# Patient Record
Sex: MALE | NOT HISPANIC OR LATINO | Employment: FULL TIME | URBAN - METROPOLITAN AREA
[De-identification: names, ages, dates, MRNs, and addresses within clinical notes are randomized per-mention and may not be internally consistent; named-entity substitution may affect disease eponyms.]

---

## 2021-04-01 ENCOUNTER — TRANSCRIBE ORDERS (OUTPATIENT)
Dept: ADMINISTRATIVE | Facility: HOSPITAL | Age: 36
End: 2021-04-01

## 2021-04-01 DIAGNOSIS — R07.9 CHEST PAIN, UNSPECIFIED TYPE: Primary | ICD-10-CM

## 2022-04-05 ENCOUNTER — HOSPITAL ENCOUNTER (OUTPATIENT)
Dept: GENERAL RADIOLOGY | Facility: HOSPITAL | Age: 37
Discharge: HOME | End: 2022-04-05
Admitting: FAMILY MEDICINE

## 2022-04-05 ENCOUNTER — TRANSCRIBE ORDERS (OUTPATIENT)
Dept: ADMINISTRATIVE | Facility: HOSPITAL | Age: 37
End: 2022-04-05

## 2022-04-05 DIAGNOSIS — M54.50 LOW BACK PAIN, UNSPECIFIED BACK PAIN LATERALITY, UNSPECIFIED CHRONICITY, UNSPECIFIED WHETHER SCIATICA PRESENT: ICD-10-CM

## 2022-04-05 DIAGNOSIS — M54.50 LOW BACK PAIN, UNSPECIFIED BACK PAIN LATERALITY, UNSPECIFIED CHRONICITY, UNSPECIFIED WHETHER SCIATICA PRESENT: Primary | ICD-10-CM

## 2022-04-05 PROCEDURE — 72100 X-RAY EXAM L-S SPINE 2/3 VWS: CPT

## 2023-07-31 ENCOUNTER — OFFICE VISIT (OUTPATIENT)
Dept: CARDIOLOGY | Facility: CLINIC | Age: 38
End: 2023-07-31
Payer: COMMERCIAL

## 2023-07-31 VITALS
DIASTOLIC BLOOD PRESSURE: 68 MMHG | OXYGEN SATURATION: 98 % | HEIGHT: 71 IN | WEIGHT: 234.6 LBS | BODY MASS INDEX: 32.84 KG/M2 | HEART RATE: 94 BPM | SYSTOLIC BLOOD PRESSURE: 140 MMHG

## 2023-07-31 DIAGNOSIS — I10 PRIMARY HYPERTENSION: ICD-10-CM

## 2023-07-31 DIAGNOSIS — R07.9 CHEST PAIN, UNSPECIFIED TYPE: Primary | ICD-10-CM

## 2023-07-31 DIAGNOSIS — R00.2 PALPITATIONS: ICD-10-CM

## 2023-07-31 PROCEDURE — 93000 ELECTROCARDIOGRAM COMPLETE: CPT | Performed by: INTERNAL MEDICINE

## 2023-07-31 PROCEDURE — 99204 OFFICE O/P NEW MOD 45 MIN: CPT | Performed by: INTERNAL MEDICINE

## 2023-07-31 RX ORDER — LOSARTAN POTASSIUM 100 MG/1
100 TABLET ORAL DAILY
COMMUNITY
Start: 2023-07-03

## 2023-07-31 RX ORDER — FENOFIBRATE 145 MG/1
145 TABLET, COATED ORAL DAILY
COMMUNITY
Start: 2023-07-03

## 2023-07-31 RX ORDER — MECLIZINE HCL 12.5 MG/1
12.5 TABLET ORAL AS NEEDED
COMMUNITY

## 2023-07-31 RX ORDER — AMLODIPINE BESYLATE 10 MG/1
10 TABLET ORAL DAILY
COMMUNITY
Start: 2023-07-10 | End: 2023-07-31 | Stop reason: ALTCHOICE

## 2023-07-31 NOTE — PROGRESS NOTES
"     [facility] Cardiology OP Consult Note    Bolivar Carreon  [id number]  2023    Referred By: Artur Clinton MD    Chief Complaint: Hypertension and chest pain    History of Present Illness:   Mr. Bolivar Carreon is a 38 y.o. male who presents to the Cardiology Clinic for evaluation of hypertension and chest pain.  The patient has a past medical history significant for hypertension, hyperlipidemia, and obesity with a BMI 32.  He does not have any significant past cardiac history.  He reports occasional episodes of palpitations, described as a \"fluttering\" sensation.  With his palpitations, he will occasionally experience chest discomfort described as a \"sharp\" sensation.  He denies any significant chest pain with exertion.  No associated nausea, vomiting, or diaphoresis.  No significant exertional dyspnea.  He reports his antihypertensives were recently changed to losartan and amlodipine, however his systolic BP has remained elevated.  No other specific complaints today.    Past Cardiac Testin. Last Coronary Angio: None  2. Prior Stress Testing: None  3. Last Echo: None  4. Prior Holter Monitor: None    Review of Systems:   Review of Systems   Constitutional:  Negative for activity change, appetite change, chills, diaphoresis, fatigue, fever, unexpected weight gain and unexpected weight loss.   Eyes:  Negative for blurred vision and double vision.   Respiratory:  Negative for cough, chest tightness, shortness of breath and wheezing.    Cardiovascular:  Positive for chest pain and palpitations. Negative for leg swelling.   Gastrointestinal:  Negative for abdominal pain, anal bleeding, blood in stool and GERD.   Endocrine: Negative for cold intolerance and heat intolerance.   Genitourinary:  Negative for hematuria.   Neurological:  Negative for dizziness, syncope, weakness and light-headedness.   Hematological:  Does not bruise/bleed easily.   Psychiatric/Behavioral:  Negative for depressed " "mood and stress. The patient is not nervous/anxious.      Past Medical History: History reviewed. No pertinent past medical history.    Past Surgical History:   Past Surgical History:   Procedure Laterality Date    LEG LACERATION REPAIR Right        Family History:   Family History   Problem Relation Age of Onset    Hypertension Mother     Hypertension Father        Social History:   Social History     Socioeconomic History    Marital status: Single   Tobacco Use    Smoking status: Never   Vaping Use    Vaping Use: Never used   Substance and Sexual Activity    Drug use: Never       Medications:     Current Outpatient Medications:     fenofibrate (TRICOR) 145 MG tablet, Take 1 tablet by mouth Daily., Disp: , Rfl:     losartan (COZAAR) 100 MG tablet, Take 1 tablet by mouth Daily., Disp: , Rfl:     carvedilol (COREG) 6.25 MG tablet, Take 1 tablet by mouth 2 (Two) Times a Day., Disp: 60 tablet, Rfl: 3    clomiPHENE (CLOMID) 50 MG tablet, Take 1 tablet by mouth Daily., Disp: , Rfl:     meclizine (ANTIVERT) 12.5 MG tablet, Take 1 tablet by mouth As Needed., Disp: , Rfl:     Allergies:   No Known Allergies    Physical Exam:  Vital Signs:   Vitals:    07/31/23 1535   BP: 140/68   BP Location: Right arm   Patient Position: Sitting   Cuff Size: Adult   Pulse: 94   SpO2: 98%   Weight: 106 kg (234 lb 9.6 oz)   Height: 180.3 cm (71\")  Comment: patient states       Physical Exam  Constitutional:       General: He is not in acute distress.     Appearance: He is obese. He is not diaphoretic.   HENT:      Head: Normocephalic and atraumatic.   Cardiovascular:      Rate and Rhythm: Normal rate and regular rhythm.      Heart sounds: No murmur heard.  Pulmonary:      Effort: Pulmonary effort is normal. No respiratory distress.      Breath sounds: Normal breath sounds. No stridor. No wheezing, rhonchi or rales.   Abdominal:      General: Bowel sounds are normal. There is no distension.      Palpations: Abdomen is soft.      Tenderness: " There is no abdominal tenderness. There is no guarding or rebound.   Musculoskeletal:         General: No swelling. Normal range of motion.      Cervical back: Neck supple. No tenderness.   Skin:     General: Skin is warm and dry.   Neurological:      General: No focal deficit present.      Mental Status: He is alert and oriented to person, place, and time.   Psychiatric:         Mood and Affect: Mood normal.         Behavior: Behavior normal.       Results Review:   I reviewed the patient's new clinical results.  I personally viewed and interpreted the patient's EKG/Telemetry data      ECG 12 Lead    Date/Time: 7/31/2023 1:34 PM  Performed by: Govind Haddad MD  Authorized by: Govind Haddad MD   Comparison: not compared with previous ECG   Previous ECG: no previous ECG available  Rhythm: sinus rhythm  Ectopy: unifocal PVCs  Rate: normal  QRS axis: normal    Clinical impression: abnormal EKG        Assessment / Plan:     1. Chest pain  -- Intermittent episodes of chest pain primarily appear to be associated with palpitations  --No association with exertion  --ECG without acute ischemic changes  --Given chest pain is atypical in character, will defer further ischemic evaluation for now  --If escalation in chest pain, will proceed with GXT    2. Palpitations  -- Occasional episodes of palpitations, suspected to be secondary to symptomatic ectopy  --ECG today shows NSR  --Will change Norvasc to carvedilol for suppression of ectopy as well as BP control  --If no significant improvement in palpitations, will proceed with outpatient cardiac monitoring    3. Primary hypertension  -- Continue losartan  --Change amlodipine to carvedilol, uptitrate as required        Follow Up:   Return in about 4 weeks (around 8/28/2023).      Thank you for allowing me to participate in the care of your patient. Please to not hesitate to contact me with additional questions or concerns.     FABIENNE Haddad MD  Interventional  Cardiology   07/31/2023  15:54 EDT

## 2023-08-01 ENCOUNTER — PATIENT ROUNDING (BHMG ONLY) (OUTPATIENT)
Dept: CARDIOLOGY | Facility: CLINIC | Age: 38
End: 2023-08-01
Payer: COMMERCIAL

## 2023-08-10 ENCOUNTER — TELEPHONE (OUTPATIENT)
Dept: CARDIOLOGY | Facility: CLINIC | Age: 38
End: 2023-08-10
Payer: COMMERCIAL

## 2023-08-21 RX ORDER — CARVEDILOL 6.25 MG/1
6.25 TABLET ORAL 2 TIMES DAILY
Qty: 60 TABLET | Refills: 3 | Status: SHIPPED | OUTPATIENT
Start: 2023-08-21

## 2023-12-21 RX ORDER — CARVEDILOL 6.25 MG/1
6.25 TABLET ORAL 2 TIMES DAILY
Qty: 60 TABLET | Refills: 0 | Status: SHIPPED | OUTPATIENT
Start: 2023-12-21

## 2023-12-21 NOTE — TELEPHONE ENCOUNTER
Refill for 60 days as a courtesy.  Patient was supposed to follow-up with Dr. Haddad for hypertension last fall.  Please help him reschedule this appt.  Thanks.

## 2024-08-09 ENCOUNTER — TRANSCRIBE ORDERS (OUTPATIENT)
Dept: GENERAL RADIOLOGY | Facility: HOSPITAL | Age: 39
End: 2024-08-09
Payer: COMMERCIAL

## 2024-08-09 ENCOUNTER — HOSPITAL ENCOUNTER (OUTPATIENT)
Dept: GENERAL RADIOLOGY | Facility: HOSPITAL | Age: 39
Discharge: HOME | End: 2024-08-09
Payer: COMMERCIAL

## 2024-08-09 DIAGNOSIS — M25.562 ACUTE PAIN OF LEFT KNEE: ICD-10-CM

## 2024-08-09 DIAGNOSIS — M25.562 ACUTE PAIN OF LEFT KNEE: Primary | ICD-10-CM

## 2024-08-09 PROCEDURE — 73562 X-RAY EXAM OF KNEE 3: CPT

## 2025-01-27 ENCOUNTER — PATIENT ROUNDING (BHMG ONLY) (OUTPATIENT)
Dept: UROLOGY | Facility: CLINIC | Age: 40
End: 2025-01-27
Payer: COMMERCIAL

## 2025-01-27 ENCOUNTER — OFFICE VISIT (OUTPATIENT)
Dept: UROLOGY | Facility: CLINIC | Age: 40
End: 2025-01-27
Payer: COMMERCIAL

## 2025-01-27 VITALS
TEMPERATURE: 98 F | HEART RATE: 84 BPM | BODY MASS INDEX: 27.49 KG/M2 | WEIGHT: 192 LBS | OXYGEN SATURATION: 97 % | DIASTOLIC BLOOD PRESSURE: 80 MMHG | SYSTOLIC BLOOD PRESSURE: 120 MMHG | HEIGHT: 70 IN

## 2025-01-27 DIAGNOSIS — Z30.09 VASECTOMY EVALUATION: Primary | ICD-10-CM

## 2025-01-27 PROCEDURE — 99203 OFFICE O/P NEW LOW 30 MIN: CPT | Performed by: UROLOGY

## 2025-01-27 RX ORDER — SULFAMETHOXAZOLE AND TRIMETHOPRIM 800; 160 MG/1; MG/1
1 TABLET ORAL DAILY
Qty: 3 TABLET | Refills: 0 | Status: SHIPPED | OUTPATIENT
Start: 2025-01-27 | End: 2025-01-30

## 2025-01-27 RX ORDER — LOSARTAN POTASSIUM 50 MG/1
50 TABLET ORAL DAILY
COMMUNITY
Start: 2025-01-18

## 2025-01-27 RX ORDER — TESTOSTERONE 20.25 MG/1.25G
1.62 GEL TOPICAL AS NEEDED
COMMUNITY
Start: 2024-11-29

## 2025-01-27 NOTE — PROGRESS NOTES
"Chief Complaint  Elective sterilization    Referring provider  Marianne Blevins, APRN    HPI  Mr. Carreon is a 39 y.o. male who presents with desire for irreversible, elective sterilization.      Past Medical History  Past Medical History:   Diagnosis Date    Hormone disorder ??    Low test    Hypertension 2002    Currently good.. Taking meds       Past Surgical History  Past Surgical History:   Procedure Laterality Date    LEG LACERATION REPAIR Right        Medications    Current Outpatient Medications:     fenofibrate (TRICOR) 145 MG tablet, Take 1 tablet by mouth Daily., Disp: , Rfl:     losartan (COZAAR) 50 MG tablet, Take 1 tablet by mouth Daily., Disp: , Rfl:     Testosterone 1.62 % gel, Apply 1.62 g topically to the appropriate area as directed As Needed., Disp: , Rfl:     Tirzepatide 10 MG/0.5ML solution auto-injector, Inject 10 mg as directed Daily., Disp: , Rfl:     Allergies  No Known Allergies    Social History  Social History     Socioeconomic History    Marital status: Single   Tobacco Use    Smoking status: Never     Passive exposure: Never    Smokeless tobacco: Never   Vaping Use    Vaping status: Never Used   Substance and Sexual Activity    Alcohol use: Not Currently    Drug use: Never    Sexual activity: Yes     Partners: Female       Family History  He has no family history of prostate cancer    Physical Exam  Visit Vitals  /80   Pulse 84   Temp 98 °F (36.7 °C)   Ht 177.8 cm (70\")   Wt 87.1 kg (192 lb)   SpO2 97%   BMI 27.55 kg/m²     Constitutional: NAD, WDWN.   HEENT: NCAT. Conjunctivae normal.  MMM.    Cardiovascular: Regular rate.  Pulmonary/Chest: Respirations are even and non-labored bilaterally.  Abdominal: Soft. No distension, tenderness, masses or guarding. No CVA tenderness.  Neurological: A + O x 3.  Cranial Nerves II-XII grossly intact. Normal gait.  Extremities: EL x 4, Warm. No clubbing.  No cyanosis.    Skin: Pink, warm and dry.  No rashes noted.  Psychiatric:  Normal mood and " affect  Genitourinary  Penis: Meatus and glans normal, no penile discharge.  No rashes/lesions.    Testes: descended bilaterally, no masses, nontender to palpation. Remainder of scrotal contents normal. No hernia appreciated.  Bilateral vasa palpable    Assessment and Plan  Mr. Carreon is a 39 y.o. male who presents today requesting permanent, irreversible surgical sterilization.  He was instructed to trim his pubic hair the night prior with a clippers.  The vasectomy was discussed in detail with the patient today including the risks which are failure of the procedure, infection, bleeding, development of chronic testicular pain and the possibility of injuring adjacent structures which could potentially result in loss of the testicle.  Furthermore, the patient was told he would remain fertile following the procedure until he provided a semen analysis that showed no sperm.  The patient expressed understanding and desire to proceed.      He will be scheduled for vasectomy with nitrous at his convenience.  I will send in a prescription for prophylactic antibiotic, anti-inflammatory to his pharmacy and order semen analysis for 3 months after.    I spent a total of 30 minutes in total patient care, which involved direct interaction, examination, chart review, and discussion of treatment options.

## 2025-01-31 NOTE — PROGRESS NOTES
January 31, 2025    Hello, may I speak with Bolivar Carreon?    My name is Radha.     I am  with [facility]  [address]  [address]  [facility] [address] [phone].    Before we get started may I verify your date of birth? 1985    I am calling to officially welcome you to our practice and ask about your recent visit. Is this a good time to talk?     Tell me about your visit with us. What things went well?         We're always looking for ways to make our patients' experiences even better. Do you have recommendations on ways we may improve?      Overall were you satisfied with your first visit to our practice?        I appreciate you taking the time to speak with me today. Is there anything else I can do for you?       Thank you, and have a great day.

## 2025-03-10 ENCOUNTER — PROCEDURE VISIT (OUTPATIENT)
Dept: UROLOGY | Facility: CLINIC | Age: 40
End: 2025-03-10
Payer: COMMERCIAL

## 2025-03-10 DIAGNOSIS — Z30.2 ENCOUNTER FOR VASECTOMY: Primary | ICD-10-CM

## 2025-03-10 PROCEDURE — 55250 REMOVAL OF SPERM DUCT(S): CPT | Performed by: UROLOGY

## 2025-03-10 NOTE — PATIENT INSTRUCTIONS
Home Care after Vasectomy   Follow these guidelines for your care after your surgery to help your recovery.    Activity   Limit your activity for the first 5 days after surgery to light activity.   You may return to work in a day or so.   Limit lifting, pushing or pulling to less than 20 pounds for the next week. Also limit running and long walks.     Swelling  Scrotal swelling from the surgery may take weeks to get better. You should call your doctor if the swelling is severe and the scrotum is larger than an orange.   Use ice packs to the scrotum and penis for 15 minutes every hour for the first 48 hours when you are awake to limit swelling. Use a plastic bag with ice or a bag of frozen peas for the ice pack. Wrap a cloth or towel around the ice pack so the ice does not directly touch your skin.   Wear a jock strap or tight underwear for the next week to support your scrotum and reduce swelling.    Incision care   Stitches will dissolve and do not need to be removed.    Expect a small amount of blood may stain the dressings for up to 72 hours after surgery.    For the first few days, apply two or three gauze pads to the site each day and as needed to keep the dressing dry. This will protect the incision and help keep your clothes clean.   When you are no longer having any drainage, stop using the gauze pads over the site.    Bathing or showering   You may shower 48 to 72 hours after surgery.  Allow the water to wash over the incision but do not scrub the incision. Dry the site gently by patting it with a clean towel.    Tub baths should be avoided for 7 days after surgery.    Swimming should be avoided for 14 days after surgery.      Pain control  You will likely be sent home with a prescription for a few days of pain medicine. .  After 48 hours, most patients can take extra strength Tylenol (acetaminophen) or Advil (ibuprofen) for pain, following the label directions. Pain most often is eased after 5 to 7  days.  Take the pain medication 2x per day for the first 3 days, scheduled.     Sexual activity  You need to avoid ejaculating for 3 days after surgery.    Follow up  You should have a semen analysis performed in 12 weeks after your procedure to confirm sterility.  Use contraceptives until this is confirmed to prevent pregnancy.    When to call your doctor   Severe swelling, larger than the size of an orange    A large amount of fluid drainage that soaks several pads per day   Pain that is not controlled with pain medicine and use of ice packs   Any signs of infection such as:        Increased redness or tenderness around the incision site        Pus type drainage from the incision        Fever of greater than 101 degrees F    Other Contacts  Urology Office:  [address]   [place] [id number] [phone] office  [phone] fax

## 2025-03-10 NOTE — PROGRESS NOTES
Preoperative diagnosis  Elective sterilization    Postoperative diagnosis  Elective sterilization    Procedure performed  Bilateral vasectomy    Surgeon  Misha Connors MD    Anesthesia  5 mL lidocaine 2% local injection  50% nitrous oxide /oxygen mixture    Complications  None    EBL  2 mL    Specimen  Left vas  Right vas    Indications  39 y.o. male agreed to undergo the above named procedure after discussion of the alternatives, risks and benefits.  Informed consent was obtained.      Procedure  The patient was brought to the procedure room and placed in supine position.  His scrotum was prepped with Hibiclens and he was draped in a sterile fashion.  A timeout was performed.  The patient was given inhaled self-administered nitrous oxide for anesthesia.    Lidocaine 2% was used to anesthetize the scrotal skin as well as perivasal sheaths using a high-pressure jet injector. A 1 cm skin incision was created with the sharp-tip mosquito and a vas clamp utilized through this incision to grasp the vas.  The left vas was elevated to the skin surface and dissected free of its perivasal sheath.  The vas was transected and the lumen was cauterized both proximally and distally.  A 4-0 chromic suture was utilized to place the proximal vasal portion under the perivasal sheath, such that the 2 ends were divided by the perivasal sheath (fascial interposition).  This portion of the vas was then allowed to drop back into the left hemiscrotum.  The right side was treated next in the same manner as described above.  The skin incision was closed with the 4-0 chromic suture.  The patient tolerated the procedure well.    It was reiterated to the patient that he would remain fertile for sometime and should present to the office for a post-vacectomy semen analysis to confirm sterility.  We will check a semen analysis in approximately 2 months. The patient expressed understanding.

## 2025-03-12 LAB — REF LAB TEST METHOD: NORMAL

## 2025-03-17 ENCOUNTER — TELEPHONE (OUTPATIENT)
Dept: UROLOGY | Facility: CLINIC | Age: 40
End: 2025-03-17

## 2025-03-17 NOTE — TELEPHONE ENCOUNTER
Hub staff attempted to follow warm transfer process and was unsuccessful     Caller: ANTONI DEAL    Relationship to patient: SELF    Best call back number: [phone] (home)      Patient is needing: PT IS CALLING AS HAD A VASECTOMY ON 3/10/25 AND STATES HE IS A QUITE A LOT OF PAIN STILL AND IS NOT SURE WHAT TO DO. PLEASE CALL PT BACK ASAP TO DISCUSS. THANK YOU.

## 2025-03-18 ENCOUNTER — OFFICE VISIT (OUTPATIENT)
Dept: UROLOGY | Facility: CLINIC | Age: 40
End: 2025-03-18
Payer: COMMERCIAL

## 2025-03-18 VITALS
WEIGHT: 192 LBS | OXYGEN SATURATION: 94 % | DIASTOLIC BLOOD PRESSURE: 84 MMHG | HEART RATE: 81 BPM | TEMPERATURE: 97 F | SYSTOLIC BLOOD PRESSURE: 122 MMHG | HEIGHT: 70 IN | BODY MASS INDEX: 27.49 KG/M2

## 2025-03-18 DIAGNOSIS — T81.41XA INFECTION OF SUPERFICIAL INCISIONAL SURGICAL SITE AFTER PROCEDURE, INITIAL ENCOUNTER: Primary | ICD-10-CM

## 2025-03-18 PROBLEM — I10 BP (HIGH BLOOD PRESSURE): Status: ACTIVE | Noted: 2025-03-18

## 2025-03-18 PROCEDURE — 99024 POSTOP FOLLOW-UP VISIT: CPT | Performed by: NURSE PRACTITIONER

## 2025-03-18 RX ORDER — SULFAMETHOXAZOLE AND TRIMETHOPRIM 800; 160 MG/1; MG/1
1 TABLET ORAL 2 TIMES DAILY
Qty: 14 TABLET | Refills: 0 | Status: CANCELLED | OUTPATIENT
Start: 2025-03-18 | End: 2025-03-25

## 2025-03-18 RX ORDER — LEVOFLOXACIN 500 MG/1
500 TABLET, FILM COATED ORAL DAILY
Qty: 10 TABLET | Refills: 0 | Status: SHIPPED | OUTPATIENT
Start: 2025-03-18 | End: 2025-03-28

## 2025-03-18 RX ORDER — TIRZEPATIDE 10 MG/.5ML
10 INJECTION, SOLUTION SUBCUTANEOUS WEEKLY
COMMUNITY
Start: 2025-03-03

## 2025-03-18 NOTE — PROGRESS NOTES
Office Visit     Patient Name: Bolivar Carreon  : 1985   MRN: [id number]     Patient or patient representative verbalized consent for the use of Ambient Listening during the visit with  CHRISSIE Sam for chart documentation. 3/18/2025  16:11 EDT    Chief Complaint:   Chief Complaint   Patient presents with    vasectomy pain     Referring Provider: No ref. provider found    Primary Care Provider: Marianne Blevins APRN     History of Present Illness  The patient is a 39-year-old male presenting with post-vasectomy pain.    Post-Vasectomy Pain  - Reports persistent, severe pain since the day after vasectomy, with left scrotal swelling.  - Pain is localized above the testicle.  - No fevers or increased activity.  - Wearing tighter underwear for recovery.  - Attempted to return to work but found walking uncomfortable.  - Experiences sharp pain when transitioning from lying to standing.  - Managing pain with ice and Aleve, with some relief, but movement remains challenging.             Subjective   Review of System:   As noted in HPI.    Past Medical History:   Diagnosis Date    History of vasectomy 03/10/2025    Hormone disorder ??    Low test    Hypertension 2002    Currently good.. Taking meds     Past Surgical History:   Procedure Laterality Date    LEG LACERATION REPAIR Right      Family History   Problem Relation Age of Onset    Hypertension Mother     Hypertension Father      Social History     Socioeconomic History    Marital status:    Tobacco Use    Smoking status: Never     Passive exposure: Never    Smokeless tobacco: Never   Vaping Use    Vaping status: Never Used   Substance and Sexual Activity    Alcohol use: Not Currently    Drug use: Never    Sexual activity: Yes     Partners: Female       Current Outpatient Medications:     fenofibrate (TRICOR) 145 MG tablet, Take 1 tablet by mouth Daily., Disp: , Rfl:     losartan (COZAAR) 50 MG tablet, Take 1 tablet by mouth Daily., Disp: ,  "Rfl:     Testosterone 1.62 % gel, Apply 1.62 g topically to the appropriate area as directed As Needed., Disp: , Rfl:     Zepbound 10 MG/0.5ML solution auto-injector, Inject 0.5 mL under the skin into the appropriate area as directed 1 (One) Time Per Week., Disp: , Rfl:     levoFLOXacin (LEVAQUIN) 500 MG tablet, Take 1 tablet by mouth Daily for 10 days., Disp: 10 tablet, Rfl: 0    No Known Allergies  Objective   Visit Vitals  /84   Pulse 81   Temp 97 °F (36.1 °C)   Ht 177.8 cm (70\")   Wt 87.1 kg (192 lb)   SpO2 94%   BMI 27.55 kg/m²        Body mass index is 27.55 kg/m².     Physical Exam  Exam conducted with a chaperone present.   Genitourinary:     Penis: Normal.       Testes:         Right: Mass, tenderness or swelling not present.         Left: Tenderness and swelling present.      Epididymis:      Left: Inflamed and enlarged. Tenderness present.          Labs  No results found for: \"COLORU\", \"CLARITYU\", \"SPECGRAV\", \"PHUR\", \"LEUKOCYTESUR\", \"NITRITE\", \"PROTEINPOCUA\", \"GLUCOSEUR\", \"KETONESU\", \"UROBILINOGEN\", \"BILIRUBINUR\", \"RBCUR\"   No results found for: \"WBCUA\", \"RBCUA\", \"BACTERIA\", \"HYALCASTU\", \"SQUAMEPIUA\"     Lab Results   Component Value Date    WBC 6.19 01/13/2014    HGB 15.6 01/13/2014    HCT 45.5 01/13/2014    MCV 86.5 01/13/2014     01/13/2014     Lab Results   Component Value Date    GLUCOSE 77 02/27/2014    CALCIUM 9.1 02/27/2014     02/27/2014    K 5.2 02/27/2014    CO2 26 02/27/2014     02/27/2014    BUN 11 02/27/2014    BUN 13 01/13/2014    CREATININE 1.1 02/27/2014    CREATININE 1.1 01/13/2014    EGFR 79 02/27/2014    EGFR 92 01/13/2014    ANIONGAP 13 (H) 02/27/2014    ALT 32 01/13/2014    AST 29 01/13/2014       No results found for: \"HGBA1C\"     No results found for: \"PSA\", \"PCTFREEPSA\", \"PROSTATEBIO\"    No results found for: \"URICACIDSTN\", \"KESM2PDYICJ\", \"LYWY4VZXUW\", \"LABMAGN\"  Lab Results   Component Value Date    URICACID 5.8 01/13/2014     No results found for: " "\"CYSTINE\", \"URINEVOLUM\", \"CALCIUMUR\", \"OXALATEU\", \"CITRATEUR\", \"LABPH\", \"URICUR\", \"NAUR\", \"KUR\", \"MAGNESIUMUR\", \"PHOSUR\", \"AMMONIUMUR\", \"CLUR\", \"OAQSGSGRW74L\", \"UREAUR\", \"LABCREAUR\"    No results found for: \"TESTOSTEROTT\", \"TESTFRE\", \"PSA\", \"ESTRADIOL\", \"LH\", \"PROLACTIN\", \"FSH\"    No results found for: \"FERRITIN\", \"FSH\", \"SEXMONB\", \"TSH\", \"FREET4\", \"T3FREE\", \"TPOABRFLX\", \"MTKUAAFI49\", \"EYBV44HT\", \"CORTISOL\", \"TLESTROGENS\", \"TESTOSTEROTT\", \"TESTFRE\", \"LIPIDEXCLUSI\"      Radiographic Studies  No Images in the past 120 days found..    I have reviewed the above labs and imaging.     Assessment / Plan      Diagnoses and all orders for this visit:    1. Infection of superficial incisional surgical site after procedure, initial encounter (Primary)  -     levoFLOXacin (LEVAQUIN) 500 MG tablet; Take 1 tablet by mouth Daily for 10 days.  Dispense: 10 tablet; Refill: 0       Assessment & Plan  1. Post-vasectomy pain: Persistent.  - Advise rest, elevation, and tight-fitting underwear  - Prescribe Levaquin, to be sent to [facility]  - Order ultrasound if swelling persists    Follow-up  - Follow up in 1 week    PROCEDURE  Recent vasectomy.       Return in about 1 week (around 3/25/2025) for Gurmeet Ramirez, MSN, APRN, FNP-C  [facility]    "